# Patient Record
Sex: FEMALE | Race: WHITE | NOT HISPANIC OR LATINO | Employment: FULL TIME | URBAN - NONMETROPOLITAN AREA
[De-identification: names, ages, dates, MRNs, and addresses within clinical notes are randomized per-mention and may not be internally consistent; named-entity substitution may affect disease eponyms.]

---

## 2022-06-03 ENCOUNTER — OFFICE VISIT (OUTPATIENT)
Dept: CARDIOLOGY | Facility: CLINIC | Age: 32
End: 2022-06-03

## 2022-06-03 VITALS
BODY MASS INDEX: 33.32 KG/M2 | WEIGHT: 200 LBS | HEIGHT: 65 IN | DIASTOLIC BLOOD PRESSURE: 76 MMHG | OXYGEN SATURATION: 97 % | SYSTOLIC BLOOD PRESSURE: 118 MMHG | HEART RATE: 57 BPM

## 2022-06-03 DIAGNOSIS — R42 LIGHTHEADEDNESS: Primary | ICD-10-CM

## 2022-06-03 DIAGNOSIS — R00.1 BRADYCARDIA, SINUS: ICD-10-CM

## 2022-06-03 DIAGNOSIS — I49.8 SINUS ARRHYTHMIA: ICD-10-CM

## 2022-06-03 PROCEDURE — 99204 OFFICE O/P NEW MOD 45 MIN: CPT | Performed by: INTERNAL MEDICINE

## 2022-06-03 PROCEDURE — 93000 ELECTROCARDIOGRAM COMPLETE: CPT | Performed by: INTERNAL MEDICINE

## 2022-06-03 RX ORDER — HYDROXYZINE PAMOATE 50 MG/1
50 CAPSULE ORAL 3 TIMES DAILY PRN
COMMUNITY

## 2022-06-03 RX ORDER — SUMATRIPTAN 50 MG/1
50 TABLET, FILM COATED ORAL
COMMUNITY
End: 2023-02-10

## 2022-06-03 NOTE — PROGRESS NOTES
Electrophysiology Clinic Consult     Naty Laureano  1990  [unfilled]  [unfilled]    06/03/22    DATE OF ADMISSION: (Not on file)  [facility] CARDIOLOGY    Radha Camilo, RAÚL  [address] / [facility] [address]  Referring Provider: No ref. provider found     Chief Complaint   Patient presents with   • Slow Heart Rate   • Syncope   • Chest Pain   • Dizziness   • Shortness of Breath   • Irregular Heart Beat     Problem list:    1.  Bradycardia   A.  Echocardiogram March 2022 LVEF normal with no significant valvular  heart disease   B.  GXT exercise test March 2022 walked for 5 minutes and 48 seconds with  normal chronotropic response getting her heart rate up to 148 bpm.   C.  2-week monitor April 5, 2022 average heart rate 75 bpm with upper  maximal heart rate 156 bpm and lowest heart rate of 37 bpm.  No significant  atrial or ventricular arrhythmias noted.  Her heart rate was below 60 bpm 30%  of the time.  Symptoms of lightheadedness skipped beats fatigue shortness of  breath correlated to heart rates from 46 to 100 bpm.    2.  Migraine headaches  3.  History of tobacco abuse now vaping  4.  Anxiety disorder  5.  Urinary tract infection      History of Present Illness:     32-year-old female referred to us for evaluation of near syncope, lightheadedness, and bradycardia.  Patient states that her history started last summer when she started noticing that her heart rate would drop seen on her watch.  Prior to that episode she denies any significant viral issues or other health issues.  She at times becomes lightheaded with the low heart rate.  She presented to the emergency room in March 2022 after having an episode of dizziness, lightheadedness and apparent brief syncope while sitting at her office at [employer].  She has had no further episodes of syncope since then.  She states that she does have lightheadedness only with sitting frequently.  She underwent an  echocardiogram, GXT, and a event recorder since then.  The results of these tests are noted above.  While in the emergency room, laboratory evaluation was within normal limits.  She had a normal TSH level.  Liver function tests were normal.  COVID testing was negative.  Her symptoms when she has them include not only lightheadedness but at times palpitations, skipped beats, and chest discomfort she describes a moderate's symptomatology.  Her hematocrit was found to be slightly decreased at 33 while she was in the emergency room.  She does note back pain that sometimes radiates to the back of her neck and numbness of her left arm and left leg.  Last ER visit prior to March 2022 was in December 2021 when she presented with chest pain and dizziness and was found to have a heart rate of 67 bpm.  She denies any recreational drug use, over-the-counter medications, or weight loss pills.  She does not exercise on a frequent basis.  There is no family history of sick sinus syndrome at a young age or the need of a pacemaker early in life.  In January of this year, the patient stopped smoking as vaping.  She is now trying to reduce the frequency of her vaping.  No history of tick bites.      No Known Allergies     Cannot display prior to admission medications because the patient has not been admitted in this contact.            Current Outpatient Medications:   •  hydrOXYzine pamoate (VISTARIL) 50 MG capsule, Take 50 mg by mouth 3 (Three) Times a Day As Needed for Itching., Disp: , Rfl:   •  SUMAtriptan (IMITREX) 50 MG tablet, Take 50 mg by mouth Every 2 (Two) Hours As Needed for Migraine. Take one tablet at onset of headache. May repeat dose one time in 2 hours if headache not relieved., Disp: , Rfl:     Social History     Socioeconomic History   • Marital status: Single   Tobacco Use   • Smoking status: Current Some Day Smoker   • Smokeless tobacco: Never Used   Vaping Use   • Vaping Use: Every day   • Substances: Nicotine  "  • Devices: Disposable   Substance and Sexual Activity   • Alcohol use: Not Currently   • Drug use: Never   • Sexual activity: Defer       Family History   Problem Relation Age of Onset   • No Known Problems Mother    • No Known Problems Father        REVIEW OF SYSTEMS:   CONST:  No weight loss, fever, chills, + weakness +fatigue.   HEENT:  No visual loss, blurred vision, double vision, yellow sclerae.                   No hearing loss, congestion, sore throat.   SKIN:      No rashes, urticaria, ulcers, sores.     RESP:     + shortness of breath, No hemoptysis, cough, sputum.   GI:           No anorexia, nausea, vomiting, diarrhea. No abdominal pain, melena.   :         No burning on urination, hematuria or increased frequency.  ENDO:    No diaphoresis, cold or heat intolerance. No polyuria or polydipsia.   NEURO:  + headache, dizziness, syncope, no paralysis, No ataxia,+ parasthesias.                  No change in bowel or bladder control. No history of CVA/TIA  MUSC:    No muscle, back pain, joint pain or stiffness.   HEME:    + anemia, No bleeding, bruising. No history of DVT/PE.  PSYCH:  + history of depression, anxiety    Vitals:    06/03/22 1335   BP: 118/76   BP Location: Right arm   Patient Position: Sitting   Pulse: 57   SpO2: 97%   Weight: 90.7 kg (200 lb)   Height: 163.8 cm (64.5\")                 Physical Exam:  GEN: Well nourished, well-developed, no acute distress  HEENT: Normocephalic, atraumatic, PERRLA, moist mucous membranes  NECK: Supple, NO JVD, no thyromegaly, no lymphadenopathy  CARD: S1S2, RRR, no murmur, gallop, rub  LUNGS: Clear to auscultation, normal respiratory effort  ABDOMEN: Soft, nontender, normal bowel sounds  EXTREMITIES: No gross deformities, no clubbing, cyanosis, or edema  SKIN: Warm, dry  NEURO: No focal deficits, alert and oriented x 3  PSYCHIATRIC: Normal affect and mood      I personally viewed and interpreted the patient's EKG/Telemetry/lab data    No results found for: " GLUCOSE, CALCIUM, NA, K, CO2, CL, BUN, CREATININE, EGFRIFAFRI, EGFRIFNONA, BCR, ANIONGAP  No results found for: WBC, HGB, HCT, MCV, PLT  No results found for: INR, PROTIME  No results found for: TSH, A0NKHBH, R7GPXNE, THYROIDAB          ECG 12 Lead    Date/Time: 6/3/2022 2:14 PM  Performed by: Momo Wilson MD  Authorized by: Momo Wilson MD   Comparison: not compared with previous ECG   Previous ECG: no previous ECG available  Rhythm: sinus bradycardia  BPM: 57                ICD-10-CM ICD-9-CM   1. Lightheadedness  R42 780.4   2. Bradycardia, sinus  R00.1 427.89   3. Sinus arrhythmia  I49.8 427.89       Assessment and Plan:     1.  Lightheadedness/near syncope etiology unknown.  Difficult to say at this time whether the episodic bradycardia is responsible for her lightheadedness.  Cardiac work-up to date is within normal limits.  On her event recorder, when she complained of lightheadedness, there was both bradycardia arrhythmias as well as normal sinus rhythm and sinus tachycardia.  She denies any previous history of significant virus infections.  He does have evidence of sinus arrhythmia as well as vagal slowing or heart rate at times.  At this point I would not favor pursuing pacemaker implantation as there is no clear-cut documentation to support improvement in her symptom complex by atrial pacing.  Instead would favor her discontinuing her vaping completely and pursue an exercise program.  Would favor also monitoring her again in the near future if she continues to have her symptoms.

## 2023-02-01 ENCOUNTER — APPOINTMENT (OUTPATIENT)
Dept: GENERAL RADIOLOGY | Facility: HOSPITAL | Age: 33
End: 2023-02-01
Payer: COMMERCIAL

## 2023-02-01 ENCOUNTER — HOSPITAL ENCOUNTER (EMERGENCY)
Facility: HOSPITAL | Age: 33
Discharge: HOME | End: 2023-02-01
Attending: EMERGENCY MEDICINE | Admitting: EMERGENCY MEDICINE
Payer: COMMERCIAL

## 2023-02-01 ENCOUNTER — APPOINTMENT (OUTPATIENT)
Dept: CT IMAGING | Facility: HOSPITAL | Age: 33
End: 2023-02-01
Payer: COMMERCIAL

## 2023-02-01 VITALS
OXYGEN SATURATION: 97 % | WEIGHT: 200 LBS | HEIGHT: 64 IN | DIASTOLIC BLOOD PRESSURE: 79 MMHG | RESPIRATION RATE: 20 BRPM | BODY MASS INDEX: 34.15 KG/M2 | TEMPERATURE: 97.7 F | HEART RATE: 45 BPM | SYSTOLIC BLOOD PRESSURE: 102 MMHG

## 2023-02-01 DIAGNOSIS — R42 DIZZINESS: ICD-10-CM

## 2023-02-01 DIAGNOSIS — R07.9 CHEST PAIN, UNSPECIFIED TYPE: Primary | ICD-10-CM

## 2023-02-01 DIAGNOSIS — R00.1 BRADYCARDIA: ICD-10-CM

## 2023-02-01 LAB
ALBUMIN SERPL-MCNC: 4.5 G/DL (ref 3.5–5.2)
ALBUMIN/GLOB SERPL: 1.7 G/DL
ALP SERPL-CCNC: 88 U/L (ref 39–117)
ALT SERPL W P-5'-P-CCNC: 19 U/L (ref 1–33)
ANION GAP SERPL CALCULATED.3IONS-SCNC: 12 MMOL/L (ref 5–15)
AST SERPL-CCNC: 15 U/L (ref 1–32)
BASOPHILS # BLD AUTO: 0.06 10*3/MM3 (ref 0–0.2)
BASOPHILS NFR BLD AUTO: 0.8 % (ref 0–1.5)
BILIRUB SERPL-MCNC: 0.8 MG/DL (ref 0–1.2)
BUN SERPL-MCNC: 12 MG/DL (ref 6–20)
BUN/CREAT SERPL: 16.7 (ref 7–25)
CALCIUM SPEC-SCNC: 9.1 MG/DL (ref 8.6–10.5)
CHLORIDE SERPL-SCNC: 106 MMOL/L (ref 98–107)
CO2 SERPL-SCNC: 22 MMOL/L (ref 22–29)
CREAT SERPL-MCNC: 0.72 MG/DL (ref 0.57–1)
DEPRECATED RDW RBC AUTO: 38.8 FL (ref 37–54)
EGFRCR SERPLBLD CKD-EPI 2021: 113.4 ML/MIN/1.73
EOSINOPHIL # BLD AUTO: 0.15 10*3/MM3 (ref 0–0.4)
EOSINOPHIL NFR BLD AUTO: 2 % (ref 0.3–6.2)
ERYTHROCYTE [DISTWIDTH] IN BLOOD BY AUTOMATED COUNT: 12.1 % (ref 12.3–15.4)
FLUAV SUBTYP SPEC NAA+PROBE: NOT DETECTED
FLUBV RNA ISLT QL NAA+PROBE: NOT DETECTED
GLOBULIN UR ELPH-MCNC: 2.7 GM/DL
GLUCOSE SERPL-MCNC: 121 MG/DL (ref 65–99)
HCT VFR BLD AUTO: 36.9 % (ref 34–46.6)
HGB BLD-MCNC: 12.5 G/DL (ref 12–15.9)
HOLD SPECIMEN: NORMAL
IMM GRANULOCYTES # BLD AUTO: 0.02 10*3/MM3 (ref 0–0.05)
IMM GRANULOCYTES NFR BLD AUTO: 0.3 % (ref 0–0.5)
LIPASE SERPL-CCNC: 46 U/L (ref 13–60)
LYMPHOCYTES # BLD AUTO: 2.22 10*3/MM3 (ref 0.7–3.1)
LYMPHOCYTES NFR BLD AUTO: 29.3 % (ref 19.6–45.3)
MAGNESIUM SERPL-MCNC: 1.8 MG/DL (ref 1.6–2.6)
MCH RBC QN AUTO: 29.5 PG (ref 26.6–33)
MCHC RBC AUTO-ENTMCNC: 33.9 G/DL (ref 31.5–35.7)
MCV RBC AUTO: 87 FL (ref 79–97)
MONOCYTES # BLD AUTO: 0.39 10*3/MM3 (ref 0.1–0.9)
MONOCYTES NFR BLD AUTO: 5.1 % (ref 5–12)
NEUTROPHILS NFR BLD AUTO: 4.74 10*3/MM3 (ref 1.7–7)
NEUTROPHILS NFR BLD AUTO: 62.5 % (ref 42.7–76)
NRBC BLD AUTO-RTO: 0 /100 WBC (ref 0–0.2)
NT-PROBNP SERPL-MCNC: 140.9 PG/ML (ref 0–450)
PLATELET # BLD AUTO: 398 10*3/MM3 (ref 140–450)
PMV BLD AUTO: 9.4 FL (ref 6–12)
POTASSIUM SERPL-SCNC: 3.3 MMOL/L (ref 3.5–5.2)
PROT SERPL-MCNC: 7.2 G/DL (ref 6–8.5)
QT INTERVAL: 454 MS
QT INTERVAL: 474 MS
QTC INTERVAL: 397 MS
QTC INTERVAL: 436 MS
RBC # BLD AUTO: 4.24 10*6/MM3 (ref 3.77–5.28)
SARS-COV-2 RNA PNL SPEC NAA+PROBE: NOT DETECTED
SODIUM SERPL-SCNC: 140 MMOL/L (ref 136–145)
TROPONIN T SERPL-MCNC: <0.01 NG/ML (ref 0–0.03)
TROPONIN T SERPL-MCNC: <0.01 NG/ML (ref 0–0.03)
TSH SERPL DL<=0.05 MIU/L-ACNC: 3.87 UIU/ML (ref 0.27–4.2)
WBC NRBC COR # BLD: 7.58 10*3/MM3 (ref 3.4–10.8)
WHOLE BLOOD HOLD COAG: NORMAL
WHOLE BLOOD HOLD SPECIMEN: NORMAL

## 2023-02-01 PROCEDURE — 99284 EMERGENCY DEPT VISIT MOD MDM: CPT

## 2023-02-01 PROCEDURE — 36415 COLL VENOUS BLD VENIPUNCTURE: CPT

## 2023-02-01 PROCEDURE — 80050 GENERAL HEALTH PANEL: CPT | Performed by: EMERGENCY MEDICINE

## 2023-02-01 PROCEDURE — 71275 CT ANGIOGRAPHY CHEST: CPT

## 2023-02-01 PROCEDURE — 83880 ASSAY OF NATRIURETIC PEPTIDE: CPT | Performed by: EMERGENCY MEDICINE

## 2023-02-01 PROCEDURE — 71045 X-RAY EXAM CHEST 1 VIEW: CPT

## 2023-02-01 PROCEDURE — 87636 SARSCOV2 & INF A&B AMP PRB: CPT | Performed by: PHYSICIAN ASSISTANT

## 2023-02-01 PROCEDURE — 84484 ASSAY OF TROPONIN QUANT: CPT | Performed by: EMERGENCY MEDICINE

## 2023-02-01 PROCEDURE — 93005 ELECTROCARDIOGRAM TRACING: CPT | Performed by: EMERGENCY MEDICINE

## 2023-02-01 PROCEDURE — 83690 ASSAY OF LIPASE: CPT | Performed by: EMERGENCY MEDICINE

## 2023-02-01 PROCEDURE — 83735 ASSAY OF MAGNESIUM: CPT | Performed by: PHYSICIAN ASSISTANT

## 2023-02-01 PROCEDURE — 0 IOPAMIDOL PER 1 ML: Performed by: EMERGENCY MEDICINE

## 2023-02-01 RX ORDER — ASPIRIN 81 MG/1
324 TABLET, CHEWABLE ORAL ONCE
Status: COMPLETED | OUTPATIENT
Start: 2023-02-01 | End: 2023-02-01

## 2023-02-01 RX ORDER — MECLIZINE HYDROCHLORIDE 25 MG/1
25 TABLET ORAL ONCE
Status: COMPLETED | OUTPATIENT
Start: 2023-02-01 | End: 2023-02-01

## 2023-02-01 RX ORDER — SODIUM CHLORIDE 0.9 % (FLUSH) 0.9 %
10 SYRINGE (ML) INJECTION AS NEEDED
Status: DISCONTINUED | OUTPATIENT
Start: 2023-02-01 | End: 2023-02-01 | Stop reason: HOSPADM

## 2023-02-01 RX ADMIN — IOPAMIDOL 79 ML: 755 INJECTION, SOLUTION INTRAVENOUS at 15:54

## 2023-02-01 RX ADMIN — MECLIZINE 25 MG: 25 TABLET ORAL at 16:56

## 2023-02-01 RX ADMIN — ASPIRIN 81 MG CHEWABLE TABLET 324 MG: 81 TABLET CHEWABLE at 16:02

## 2023-02-01 NOTE — ED PROVIDER NOTES
Subjective   History of Present Illness  Pt is a 33 yo female presenting to ED with complaints of chest pain. PMHx significant Anxiety, Afib (?) and Bradycardia. Pt reports mid sternal chest pressure and SOB for the past 2-3 hours with episodes of lightheadedness which she thought initially was reflux. She reports yesterday was having neck and upper back pain and evaluated at [place] ED with negative CT head and neck. She has had intermittent tingling to bilateral hands. She denies headache, syncope, fever, cough, SOB, N/V/D, abdominal pain or leg swelling. She reports episodes of bradycardia, CP, lightheadedness for about a year and has been followed by Dr. Wilson in the past. She vapes but denies ETOH or drug use.     History provided by:  Medical records and patient      Review of Systems   Constitutional: Negative for chills and fever.   HENT: Negative for congestion.    Eyes: Negative for visual disturbance.   Respiratory: Positive for shortness of breath. Negative for cough.    Cardiovascular: Positive for chest pain. Negative for leg swelling.   Gastrointestinal: Negative for abdominal pain, diarrhea, nausea and vomiting.   Genitourinary: Negative for difficulty urinating, dysuria, flank pain and hematuria.   Musculoskeletal: Positive for back pain (upper, resolved, yesterday) and neck pain (yesterday, resolved). Negative for arthralgias.   Skin: Negative for rash and wound.   Neurological: Positive for light-headedness. Negative for syncope, speech difficulty, weakness, numbness and headaches.   Psychiatric/Behavioral: Negative for confusion.   All other systems reviewed and are negative.      Past Medical History:   Diagnosis Date   • Abnormal ECG    • Anxiety    • Atrial fibrillation (HCC)        No Known Allergies    Past Surgical History:   Procedure Laterality Date   • CHOLECYSTECTOMY     • KNEE ACL RECONSTRUCTION         Family History   Problem Relation Age of Onset   • No Known Problems Mother     • No Known Problems Father        Social History     Socioeconomic History   • Marital status: Single   Tobacco Use   • Smoking status: Some Days   • Smokeless tobacco: Never   Vaping Use   • Vaping Use: Every day   • Substances: Nicotine   • Devices: Disposable   Substance and Sexual Activity   • Alcohol use: Not Currently   • Drug use: Never   • Sexual activity: Defer           Objective   Physical Exam  Vitals and nursing note reviewed.   Constitutional:       General: She is not in acute distress.     Appearance: She is well-developed. She is obese.   HENT:      Head: Atraumatic.      Nose: Nose normal.   Eyes:      General: Lids are normal.      Conjunctiva/sclera: Conjunctivae normal.      Pupils: Pupils are equal, round, and reactive to light.   Cardiovascular:      Rate and Rhythm: Regular rhythm. Bradycardia present.      Heart sounds: Normal heart sounds.   Pulmonary:      Effort: Pulmonary effort is normal.      Breath sounds: Normal breath sounds. No wheezing.   Abdominal:      General: There is no distension.      Palpations: Abdomen is soft.      Tenderness: There is no abdominal tenderness. There is no guarding or rebound.   Musculoskeletal:         General: No tenderness. Normal range of motion.      Cervical back: Normal range of motion and neck supple.   Skin:     General: Skin is warm and dry.      Findings: No erythema or rash.   Neurological:      Mental Status: She is alert and oriented to person, place, and time.      Cranial Nerves: Cranial nerves 2-12 are intact.      Sensory: Sensation is intact. No sensory deficit.      Motor: Motor function is intact.   Psychiatric:         Mood and Affect: Mood is anxious.         Speech: Speech normal.         Behavior: Behavior normal.         Procedures           ED Course  ED Course as of 02/01/23 2022 Wed Feb 01, 2023   1524 Troponin T: <0.010 [RT]   1524 proBNP: 140.9 [RT]   1524 TSH Baseline: 3.870 [RT]   1622 CTA chest negative for acute  findings.   Awaiting repeat Trop / EKG.  [RT]      ED Course User Index  [RT] Pearl Young, PA      Recent Results (from the past 24 hour(s))   ECG 12 Lead ED Triage Standing Order; Chest Pain    Collection Time: 02/01/23  1:41 PM   Result Value Ref Range    QT Interval 474 ms    QTC Interval 436 ms   Troponin    Collection Time: 02/01/23  1:56 PM    Specimen: Blood   Result Value Ref Range    Troponin T <0.010 0.000 - 0.030 ng/mL   Comprehensive Metabolic Panel    Collection Time: 02/01/23  1:56 PM    Specimen: Blood   Result Value Ref Range    Glucose 121 (H) 65 - 99 mg/dL    BUN 12 6 - 20 mg/dL    Creatinine 0.72 0.57 - 1.00 mg/dL    Sodium 140 136 - 145 mmol/L    Potassium 3.3 (L) 3.5 - 5.2 mmol/L    Chloride 106 98 - 107 mmol/L    CO2 22.0 22.0 - 29.0 mmol/L    Calcium 9.1 8.6 - 10.5 mg/dL    Total Protein 7.2 6.0 - 8.5 g/dL    Albumin 4.5 3.5 - 5.2 g/dL    ALT (SGPT) 19 1 - 33 U/L    AST (SGOT) 15 1 - 32 U/L    Alkaline Phosphatase 88 39 - 117 U/L    Total Bilirubin 0.8 0.0 - 1.2 mg/dL    Globulin 2.7 gm/dL    A/G Ratio 1.7 g/dL    BUN/Creatinine Ratio 16.7 7.0 - 25.0    Anion Gap 12.0 5.0 - 15.0 mmol/L    eGFR 113.4 >60.0 mL/min/1.73   Lipase    Collection Time: 02/01/23  1:56 PM    Specimen: Blood   Result Value Ref Range    Lipase 46 13 - 60 U/L   BNP    Collection Time: 02/01/23  1:56 PM    Specimen: Blood   Result Value Ref Range    proBNP 140.9 0.0 - 450.0 pg/mL   Green Top (Gel)    Collection Time: 02/01/23  1:56 PM   Result Value Ref Range    Extra Tube Hold for add-ons.    Lavender Top    Collection Time: 02/01/23  1:56 PM   Result Value Ref Range    Extra Tube hold for add-on    Gold Top - SST    Collection Time: 02/01/23  1:56 PM   Result Value Ref Range    Extra Tube Hold for add-ons.    Gray Top    Collection Time: 02/01/23  1:56 PM   Result Value Ref Range    Extra Tube Hold for add-ons.    Light Blue Top    Collection Time: 02/01/23  1:56 PM   Result Value Ref Range    Extra Tube Hold for  add-ons.    CBC Auto Differential    Collection Time: 02/01/23  1:56 PM    Specimen: Blood   Result Value Ref Range    WBC 7.58 3.40 - 10.80 10*3/mm3    RBC 4.24 3.77 - 5.28 10*6/mm3    Hemoglobin 12.5 12.0 - 15.9 g/dL    Hematocrit 36.9 34.0 - 46.6 %    MCV 87.0 79.0 - 97.0 fL    MCH 29.5 26.6 - 33.0 pg    MCHC 33.9 31.5 - 35.7 g/dL    RDW 12.1 (L) 12.3 - 15.4 %    RDW-SD 38.8 37.0 - 54.0 fl    MPV 9.4 6.0 - 12.0 fL    Platelets 398 140 - 450 10*3/mm3    Neutrophil % 62.5 42.7 - 76.0 %    Lymphocyte % 29.3 19.6 - 45.3 %    Monocyte % 5.1 5.0 - 12.0 %    Eosinophil % 2.0 0.3 - 6.2 %    Basophil % 0.8 0.0 - 1.5 %    Immature Grans % 0.3 0.0 - 0.5 %    Neutrophils, Absolute 4.74 1.70 - 7.00 10*3/mm3    Lymphocytes, Absolute 2.22 0.70 - 3.10 10*3/mm3    Monocytes, Absolute 0.39 0.10 - 0.90 10*3/mm3    Eosinophils, Absolute 0.15 0.00 - 0.40 10*3/mm3    Basophils, Absolute 0.06 0.00 - 0.20 10*3/mm3    Immature Grans, Absolute 0.02 0.00 - 0.05 10*3/mm3    nRBC 0.0 0.0 - 0.2 /100 WBC   TSH    Collection Time: 02/01/23  1:56 PM    Specimen: Blood   Result Value Ref Range    TSH 3.870 0.270 - 4.200 uIU/mL   Magnesium    Collection Time: 02/01/23  1:56 PM    Specimen: Blood   Result Value Ref Range    Magnesium 1.8 1.6 - 2.6 mg/dL   COVID-19 and FLU A/B PCR - Swab, Nasopharynx    Collection Time: 02/01/23  2:00 PM    Specimen: Nasopharynx; Swab   Result Value Ref Range    COVID19 Not Detected Not Detected - Ref. Range    Influenza A PCR Not Detected Not Detected    Influenza B PCR Not Detected Not Detected   Troponin    Collection Time: 02/01/23  4:28 PM    Specimen: Blood   Result Value Ref Range    Troponin T <0.010 0.000 - 0.030 ng/mL   ECG 12 Lead Chest Pain    Collection Time: 02/01/23  4:38 PM   Result Value Ref Range    QT Interval 454 ms    QTC Interval 397 ms     Note: In addition to lab results from this visit, the labs listed above may include labs taken at another facility or during a different encounter within  the last 24 hours. Please correlate lab times with ED admission and discharge times for further clarification of the services performed during this visit.    CT Angiogram Chest   Final Result   Impression:   No evidence of pulmonary embolus or other acute process in the chest.      Electronically Signed: Sharath Groves     2/1/2023 4:04 PM EST     Workstation ID: [id number]      XR Chest 1 View   Final Result   Impression:   Mild nonspecific peribronchial thickening. No other evidence of active chest pathology.      Electronically Signed: Nicola Kinsey     2/1/2023 2:32 PM EST     Workstation ID: [id number]        Vitals:    02/01/23 1525 02/01/23 1600 02/01/23 1630 02/01/23 1700   BP:  115/61 109/84 102/79   BP Location:       Patient Position:       Pulse: 54 50 59 (!) 45   Resp:       Temp:       TempSrc:       SpO2: 100% 95% 100% 97%   Weight:       Height:         Medications   aspirin chewable tablet 324 mg (324 mg Oral Given 2/1/23 1602)   iopamidol (ISOVUE-370) 76 % injection 100 mL (79 mL Intravenous Given 2/1/23 1554)   meclizine (ANTIVERT) tablet 25 mg (25 mg Oral Given 2/1/23 1656)     ECG/EMG Results (last 24 hours)     Procedure Component Value Units Date/Time    ECG 12 Lead ED Triage Standing Order; Chest Pain [[id number]] Collected: 02/01/23 1341     Updated: 02/01/23 1600     QT Interval 474 ms      QTC Interval 436 ms     Narrative:      Test Reason : ED Triage Standing Order~  Blood Pressure :   */*   mmHG  Vent. Rate :  51 BPM     Atrial Rate :  51 BPM     P-R Int : 154 ms          QRS Dur :  88 ms      QT Int : 474 ms       P-R-T Axes :  16   7  14 degrees     QTc Int : 436 ms    Sinus bradycardia with sinus arrhythmia  Otherwise normal ECG  No previous ECGs available  Confirmed by PATRICIA SMTIH MD ([id number]) on 2/1/2023 4:00:13 PM    Referred By: PHUONG           Confirmed By: PATRICIA SMITH MD        ECG 12 Lead Chest Pain   Final Result   Test Reason : Chest Pain   Blood Pressure :   */*   mmHG   Vent.  Rate :  46 BPM     Atrial Rate :  46 BPM      P-R Int : 158 ms          QRS Dur :  78 ms       QT Int : 454 ms       P-R-T Axes :  29   8  22 degrees      QTc Int : 397 ms      Sinus bradycardia   Otherwise normal ECG   When compared with ECG of 01-FEB-2023 13:41,   No significant change was found   Confirmed by PATRICIA SMITH MD ([id number]) on 2/1/2023 5:06:03 PM      Referred By: PHUONG           Confirmed By: PATRICIA SMITH MD      ECG 12 Lead ED Triage Standing Order; Chest Pain   Final Result   Test Reason : ED Triage Standing Order~   Blood Pressure :   */*   mmHG   Vent. Rate :  51 BPM     Atrial Rate :  51 BPM      P-R Int : 154 ms          QRS Dur :  88 ms       QT Int : 474 ms       P-R-T Axes :  16   7  14 degrees      QTc Int : 436 ms      Sinus bradycardia with sinus arrhythmia   Otherwise normal ECG   No previous ECGs available   Confirmed by PATRICIA SMITH MD ([id number]) on 2/1/2023 4:00:13 PM      Referred By: PHUONG           Confirmed By: PATRICIA SMITH MD                                               Medical Decision Making  Pt is a 31 yo female presenting to ED with complaints of CP, SOB and tingling in hands. Pt with hx of prior bradycardia and followed by Dr. Wilson. She was seen at [place] ED yesterday for neck pain and lightheadedness. Reviewed records from [place] and negative CTA head and neck for emergent findings. ED workup today with no acute ischemic changes on EKG x2 and Trop x2 WNL. CTA chest WNL. Discussed patient with Dr. Smith and he is agreeable with ED course and tx plan. Re-examined patient several times in ED. She is feeling better and discussed discharge with outpatient f/u with cardiology and PCP. Referral placed for cardiology follow up. Patient and family agreeable with plan. Went over new / worse sx to return to ED.     DDx  CVA, PE, ACS, Unstable angina, Arrhythmia, Electrolyte abnormality, Aortic dissection, Pneumonia    Bradycardia: acute illness or injury  Chest pain,  unspecified type: acute illness or injury  Dizziness: acute illness or injury  Amount and/or Complexity of Data Reviewed  External Data Reviewed: labs, radiology and notes.     Details: PCP, Cards, Outside ED records   Labs: ordered. Decision-making details documented in ED Course.  Radiology: ordered. Decision-making details documented in ED Course.  ECG/medicine tests: ordered. Decision-making details documented in ED Course.      Risk  OTC drugs.  Prescription drug management.          Final diagnoses:   Chest pain, unspecified type   Dizziness   Bradycardia       ED Disposition  ED Disposition     ED Disposition   Discharge    Condition   Stable    Comment   --             [facility] CARDIOLOGY  [address]  [address]  [facility] [id number] [phone]        Kristi Luna, APRN  [address]  [address]  [facility] [address] [phone]    Schedule an appointment as soon as possible for a visit       [facility] Emergency Department  [address] [address] [phone]    If symptoms worsen         Medication List      No changes were made to your prescriptions during this visit.          Pearl Young PA  02/01/23 2022

## 2023-02-09 PROBLEM — R00.1 BRADYCARDIA: Status: ACTIVE | Noted: 2023-02-09

## 2023-02-09 PROBLEM — R00.2 PALPITATIONS: Status: ACTIVE | Noted: 2023-02-09

## 2023-02-09 PROBLEM — R07.89 ATYPICAL CHEST PAIN: Status: ACTIVE | Noted: 2023-02-09

## 2023-02-09 PROBLEM — F41.9 ANXIETY: Status: ACTIVE | Noted: 2023-02-09

## 2023-02-09 NOTE — PROGRESS NOTES
"[facility]  Heart and Valve Center    Chief Complaint  Chest Pain, Dizziness, and Slow Heart Rate    Subjective    History of Present Illness {CC  Problem List  Visit  Diagnosis   Encounters  Notes  Medications  Labs  Result Review Imaging  Media :23}     Naty Laureano is a 32 y.o. female with bradycardia, history of atypical chest pain and dizziness who presents today for ongoing evaluation of chest pain and dizziness at the request of Bernice Young PA-C.    Patient presented to the ED on 2/1 with complaints of chest pain.  She reports a midsternal chest pressure and shortness of breath with associated lightheadedness.  She was also evaluated at [facility] 1 day prior.  Reports she had a pain in the back of her neck that shot down her spine and says she \"passed out\" on the floor for a few seconds. CTA chest, labs and EKGs unremarkable.    She was evaluated last year by Dr. Wilson for lightheadedness and bradycardia.  She had an exercise stress test in March 2022 with normal chronotropic response and no evidence of ischemia.  She wore 2-week Holter monitor in April 2022 which showed no arrhythmias, sinus bradycardia noted.    Reports constant dizziness and lightheadedness with associated headache and numbness and tingling of her fingers. Feels like the environment is spinning. Feels random and not always with position changes. Notes occasional sharp chest pains that radiate to her right shoulder. Chest pains occurs at rest and occasionally with activity, but main concern is lightheadedness with exertion. Reports frequent palpitations, like her \"heart is out of rhythm\". Reports 3 episodes of passing out in the past 6 or 7 months.     Works in administration at [employer]    Quit vaping 2 weeks ago     Cardiac risks: obesity, family hx    Objective     Vital Signs:   Vitals:    02/10/23 0819 02/10/23 0821 02/10/23 0822   BP: 116/70 121/83 116/73   BP Location: Right arm Left " "arm Left arm   Patient Position: Sitting Standing Sitting   Cuff Size: Adult Adult Adult   Pulse: 50 70 51   Resp:   20   Temp: 97.1 °F (36.2 °C) 97.1 °F (36.2 °C) 97.1 °F (36.2 °C)   TempSrc: Temporal Temporal Temporal   SpO2: 98% 99% 99%   Weight:   102 kg (224 lb 6.4 oz)   Height:   162.6 cm (64\")     Body mass index is 38.52 kg/m².  Physical Exam  Vitals reviewed.   Constitutional:       Appearance: Normal appearance.   HENT:      Head: Normocephalic.   Neck:      Vascular: No carotid bruit.   Cardiovascular:      Rate and Rhythm: Regular rhythm. Bradycardia present.      Pulses: Normal pulses.      Heart sounds: Normal heart sounds, S1 normal and S2 normal. No murmur heard.  Pulmonary:      Effort: Pulmonary effort is normal. No respiratory distress.      Breath sounds: Normal breath sounds.   Chest:      Chest wall: No tenderness.   Abdominal:      General: Abdomen is flat.      Palpations: Abdomen is soft.   Musculoskeletal:      Cervical back: Neck supple.      Right lower leg: No edema.      Left lower leg: No edema.   Skin:     General: Skin is warm and dry.   Neurological:      General: No focal deficit present.      Mental Status: She is alert and oriented to person, place, and time. Mental status is at baseline.   Psychiatric:         Mood and Affect: Mood normal.         Behavior: Behavior normal.         Thought Content: Thought content normal.              Result Review  Data Reviewed:{ Labs  Result Review  Imaging  Med Tab  Media :23}   Troponin (02/01/2023 16:28)  COVID PRE-OP / PRE-PROCEDURE SCREENING ORDER (NO ISOLATION) - Swab, Nasopharynx (02/01/2023 14:00)  Magnesium (02/01/2023 13:56)  TSH (02/01/2023 13:56)  BNP (02/01/2023 13:56)  Lipase (02/01/2023 13:56)  Comprehensive Metabolic Panel (02/01/2023 13:56)  CBC & Differential (02/01/2023 13:56)  Troponin (02/01/2023 13:56)  ECG 12 Lead Chest Pain (02/01/2023 16:38)  ECG 12 Lead ED Triage Standing Order; Chest Pain (02/01/2023 " 13:41)  SCANNED - HOLTER MONITOR (04/05/2022)    Reviewed noted from Dr. Wilson           Assessment and Plan {CC Problem List  Visit Diagnosis  ROS  Review (Popup)  Health Maintenance  Quality  BestPractice  Medications  SmartSets  SnapShot Encounters  Media :23}   1. Lightheadedness  - Hx of symptomatic sinus bradycardia  - Treadmill Stress Test; Future  - Mobile Cardiac Outpatient Telemetry; Future    2. Bradycardia, sinus    - Treadmill Stress Test; Future  - Mobile Cardiac Outpatient Telemetry; Future    3. Syncope and collapse    - Treadmill Stress Test; Future  - Mobile Cardiac Outpatient Telemetry; Future    4. Chest pain, unspecified type  -Symptoms are atypical for ischemia, however, due to reduced exercise tolerance she needs a repeat GXT to assess for chronotropic incompetence  - Treadmill Stress Test; Future      Follow Up {Instructions Charge Capture  Follow-up Communications :23}   Return in about 6 weeks (around 3/24/2023) for Monitor results, Video Visit.    Patient was given instructions and counseling regarding her condition or for health maintenance advice. Please see specific information pulled into the AVS if appropriate.  Advised to call the Heart and Valve Center with any questions, concerns, or worsening symptoms.

## 2023-02-10 ENCOUNTER — HOSPITAL ENCOUNTER (OUTPATIENT)
Dept: CARDIOLOGY | Facility: HOSPITAL | Age: 33
Discharge: HOME | End: 2023-02-10
Payer: COMMERCIAL

## 2023-02-10 ENCOUNTER — OFFICE VISIT (OUTPATIENT)
Dept: CARDIOLOGY | Facility: HOSPITAL | Age: 33
End: 2023-02-10
Payer: COMMERCIAL

## 2023-02-10 VITALS
HEART RATE: 51 BPM | TEMPERATURE: 97.1 F | RESPIRATION RATE: 20 BRPM | HEIGHT: 64 IN | DIASTOLIC BLOOD PRESSURE: 73 MMHG | OXYGEN SATURATION: 99 % | SYSTOLIC BLOOD PRESSURE: 116 MMHG | WEIGHT: 224.4 LBS | BODY MASS INDEX: 38.31 KG/M2

## 2023-02-10 DIAGNOSIS — R00.1 BRADYCARDIA, SINUS: ICD-10-CM

## 2023-02-10 DIAGNOSIS — R55 SYNCOPE AND COLLAPSE: ICD-10-CM

## 2023-02-10 DIAGNOSIS — R42 LIGHTHEADEDNESS: ICD-10-CM

## 2023-02-10 DIAGNOSIS — R42 LIGHTHEADEDNESS: Primary | ICD-10-CM

## 2023-02-10 DIAGNOSIS — R07.9 CHEST PAIN, UNSPECIFIED TYPE: ICD-10-CM

## 2023-02-10 PROCEDURE — 99214 OFFICE O/P EST MOD 30 MIN: CPT | Performed by: NURSE PRACTITIONER

## 2023-02-10 RX ORDER — MECLIZINE HYDROCHLORIDE 25 MG/1
25 TABLET ORAL 3 TIMES DAILY PRN
COMMUNITY

## 2023-02-10 RX ORDER — MULTIPLE VITAMINS W/ MINERALS TAB 9MG-400MCG
1 TAB ORAL DAILY
COMMUNITY

## 2023-02-10 RX ORDER — PANTOPRAZOLE SODIUM 20 MG/1
20 TABLET, DELAYED RELEASE ORAL DAILY
COMMUNITY

## 2023-02-10 NOTE — PROGRESS NOTES
[facility] Heart Monitor Documentation    Naty Laureano  1990  [id number]  02/10/23      [] ZIO XT Patch  Model [id number] Prescribed for  Days    · Serial Number: (N + 9 Digits) N   · Apply-By Date on Box:   · USPS Tracking Number:   · USPS Tracking        [x] [employer] BodyGuardian MINI PLUS Mobile Cardiac Telemetry  Model BGMINIPLUS Prescribed for 30 Days    · Serial Number: (BGM + 7 Digits) [id number]  · Shipped-By Date on Box: 01/30/2023  · UPS Tracking Number: [id number]  · UPS Tracking      [] [employer]ian MINI Holter Monitor  Model BGMINIEL Prescribed for  Days    · Serial Number: (7 Digits)   · Shipped-By Date on Box:   · UPS Tracking Number: [id number]  · UPS Tracking        This monitor was applied to the patient's chest and checked for proper functioning.  Ms. Naty Laureano was instructed in the proper use of this monitor.  She was given the opportunity to ask questions and left the office with the device 's instruction manual.    Lesli Lr, [facility], 08:45 EST, 02/10/23                  [facility]

## 2023-02-13 ENCOUNTER — TELEPHONE (OUTPATIENT)
Dept: CARDIOLOGY | Facility: HOSPITAL | Age: 33
End: 2023-02-13
Payer: COMMERCIAL

## 2023-02-13 NOTE — TELEPHONE ENCOUNTER
----- Message from Lesli Lr CMA sent at 2/13/2023  8:19 AM EST -----  Patient reports that yesterday she was at a friend's house and had a feeling of lightheadedness and dizziness. She states that her friend told her that she looked really pale. She states that she went to the ER last night at [facility] and they were asking if she could get the provider to look at her strips on the heart monitor to see if there was any events. She states that she is still having some lightheadedness and dizziness today. She would like to have a call back.     Medical records have been requested.

## 2023-02-13 NOTE — TELEPHONE ENCOUNTER
Attempted to contact patient but unable to reach. Do you know what time? I did not see any events from last night. There was one event yesterday morning around 9am but it looked like artifact and haven't seen anything since.Would recommend that she continue to wear so we can look for further events and I will let her know if I see anything

## 2023-02-13 NOTE — TELEPHONE ENCOUNTER
Pt returned call and stated she is still having symptoms. I told her that you recommend to continue to wear monitor. Pt verbalized understanding had no further concerns. Still waiting for all the ED records to come through from [facility]

## 2023-02-14 ENCOUNTER — HOSPITAL ENCOUNTER (EMERGENCY)
Facility: HOSPITAL | Age: 33
Discharge: HOME | End: 2023-02-14
Attending: EMERGENCY MEDICINE | Admitting: EMERGENCY MEDICINE
Payer: COMMERCIAL

## 2023-02-14 VITALS
TEMPERATURE: 98.8 F | DIASTOLIC BLOOD PRESSURE: 64 MMHG | BODY MASS INDEX: 33.32 KG/M2 | RESPIRATION RATE: 16 BRPM | OXYGEN SATURATION: 98 % | WEIGHT: 200 LBS | HEIGHT: 65 IN | SYSTOLIC BLOOD PRESSURE: 122 MMHG | HEART RATE: 86 BPM

## 2023-02-14 DIAGNOSIS — R00.1 BRADYCARDIA: ICD-10-CM

## 2023-02-14 DIAGNOSIS — R00.2 PALPITATIONS: Primary | ICD-10-CM

## 2023-02-14 DIAGNOSIS — R55 PRE-SYNCOPE: ICD-10-CM

## 2023-02-14 DIAGNOSIS — R55 SYNCOPE, UNSPECIFIED SYNCOPE TYPE: ICD-10-CM

## 2023-02-14 DIAGNOSIS — R42 EPISODIC LIGHTHEADEDNESS: ICD-10-CM

## 2023-02-14 DIAGNOSIS — Z91.89 AT RISK FOR OBSTRUCTIVE SLEEP APNEA: ICD-10-CM

## 2023-02-14 LAB
ALBUMIN SERPL-MCNC: 4.4 G/DL (ref 3.5–5.2)
ALBUMIN/GLOB SERPL: 1.5 G/DL
ALP SERPL-CCNC: 101 U/L (ref 39–117)
ALT SERPL W P-5'-P-CCNC: 29 U/L (ref 1–33)
ANION GAP SERPL CALCULATED.3IONS-SCNC: 10 MMOL/L (ref 5–15)
AST SERPL-CCNC: 17 U/L (ref 1–32)
BASOPHILS # BLD AUTO: 0.06 10*3/MM3 (ref 0–0.2)
BASOPHILS NFR BLD AUTO: 0.5 % (ref 0–1.5)
BILIRUB SERPL-MCNC: 1 MG/DL (ref 0–1.2)
BUN SERPL-MCNC: 10 MG/DL (ref 6–20)
BUN/CREAT SERPL: 16.4 (ref 7–25)
CALCIUM SPEC-SCNC: 9.2 MG/DL (ref 8.6–10.5)
CHLORIDE SERPL-SCNC: 104 MMOL/L (ref 98–107)
CO2 SERPL-SCNC: 24 MMOL/L (ref 22–29)
CREAT SERPL-MCNC: 0.61 MG/DL (ref 0.57–1)
DEPRECATED RDW RBC AUTO: 38.8 FL (ref 37–54)
EGFRCR SERPLBLD CKD-EPI 2021: 121.2 ML/MIN/1.73
EOSINOPHIL # BLD AUTO: 0.17 10*3/MM3 (ref 0–0.4)
EOSINOPHIL NFR BLD AUTO: 1.5 % (ref 0.3–6.2)
ERYTHROCYTE [DISTWIDTH] IN BLOOD BY AUTOMATED COUNT: 12 % (ref 12.3–15.4)
FLUAV RNA RESP QL NAA+PROBE: NOT DETECTED
FLUBV RNA RESP QL NAA+PROBE: NOT DETECTED
GEN 5 2HR TROPONIN T REFLEX: 10 NG/L
GLOBULIN UR ELPH-MCNC: 3 GM/DL
GLUCOSE SERPL-MCNC: 95 MG/DL (ref 65–99)
HCT VFR BLD AUTO: 36 % (ref 34–46.6)
HETEROPH AB SER QL LA: NEGATIVE
HGB BLD-MCNC: 11.8 G/DL (ref 12–15.9)
HOLD SPECIMEN: NORMAL
IMM GRANULOCYTES # BLD AUTO: 0.04 10*3/MM3 (ref 0–0.05)
IMM GRANULOCYTES NFR BLD AUTO: 0.3 % (ref 0–0.5)
LYMPHOCYTES # BLD AUTO: 1.89 10*3/MM3 (ref 0.7–3.1)
LYMPHOCYTES NFR BLD AUTO: 16.4 % (ref 19.6–45.3)
MAGNESIUM SERPL-MCNC: 1.8 MG/DL (ref 1.6–2.6)
MCH RBC QN AUTO: 29 PG (ref 26.6–33)
MCHC RBC AUTO-ENTMCNC: 32.8 G/DL (ref 31.5–35.7)
MCV RBC AUTO: 88.5 FL (ref 79–97)
MONOCYTES # BLD AUTO: 0.8 10*3/MM3 (ref 0.1–0.9)
MONOCYTES NFR BLD AUTO: 6.9 % (ref 5–12)
NEUTROPHILS NFR BLD AUTO: 74.4 % (ref 42.7–76)
NEUTROPHILS NFR BLD AUTO: 8.57 10*3/MM3 (ref 1.7–7)
NRBC BLD AUTO-RTO: 0 /100 WBC (ref 0–0.2)
PLATELET # BLD AUTO: 352 10*3/MM3 (ref 140–450)
PMV BLD AUTO: 9.4 FL (ref 6–12)
POTASSIUM SERPL-SCNC: 3.7 MMOL/L (ref 3.5–5.2)
PROT SERPL-MCNC: 7.4 G/DL (ref 6–8.5)
RBC # BLD AUTO: 4.07 10*6/MM3 (ref 3.77–5.28)
S PYO AG THROAT QL: POSITIVE
SARS-COV-2 RNA RESP QL NAA+PROBE: NOT DETECTED
SODIUM SERPL-SCNC: 138 MMOL/L (ref 136–145)
TROPONIN T DELTA: ABNORMAL
TROPONIN T SERPL HS-MCNC: <6 NG/L
WBC NRBC COR # BLD: 11.53 10*3/MM3 (ref 3.4–10.8)
WHOLE BLOOD HOLD COAG: NORMAL
WHOLE BLOOD HOLD SPECIMEN: NORMAL

## 2023-02-14 PROCEDURE — C9803 HOPD COVID-19 SPEC COLLECT: HCPCS | Performed by: EMERGENCY MEDICINE

## 2023-02-14 PROCEDURE — 84484 ASSAY OF TROPONIN QUANT: CPT

## 2023-02-14 PROCEDURE — 93005 ELECTROCARDIOGRAM TRACING: CPT | Performed by: EMERGENCY MEDICINE

## 2023-02-14 PROCEDURE — 87880 STREP A ASSAY W/OPTIC: CPT | Performed by: EMERGENCY MEDICINE

## 2023-02-14 PROCEDURE — 36415 COLL VENOUS BLD VENIPUNCTURE: CPT

## 2023-02-14 PROCEDURE — 86308 HETEROPHILE ANTIBODY SCREEN: CPT | Performed by: EMERGENCY MEDICINE

## 2023-02-14 PROCEDURE — 87636 SARSCOV2 & INF A&B AMP PRB: CPT | Performed by: EMERGENCY MEDICINE

## 2023-02-14 PROCEDURE — 99283 EMERGENCY DEPT VISIT LOW MDM: CPT | Performed by: INTERNAL MEDICINE

## 2023-02-14 PROCEDURE — 80053 COMPREHEN METABOLIC PANEL: CPT

## 2023-02-14 PROCEDURE — 83735 ASSAY OF MAGNESIUM: CPT

## 2023-02-14 PROCEDURE — 93005 ELECTROCARDIOGRAM TRACING: CPT

## 2023-02-14 PROCEDURE — 99284 EMERGENCY DEPT VISIT MOD MDM: CPT

## 2023-02-14 PROCEDURE — 85025 COMPLETE CBC W/AUTO DIFF WBC: CPT

## 2023-02-14 PROCEDURE — 84484 ASSAY OF TROPONIN QUANT: CPT | Performed by: EMERGENCY MEDICINE

## 2023-02-14 RX ORDER — SODIUM CHLORIDE 0.9 % (FLUSH) 0.9 %
10 SYRINGE (ML) INJECTION AS NEEDED
Status: DISCONTINUED | OUTPATIENT
Start: 2023-02-14 | End: 2023-02-14 | Stop reason: HOSPADM

## 2023-02-14 NOTE — DISCHARGE INSTRUCTIONS
Follow-up with the heart valve center for reevaluation.  Drink plenty of fluids.  It is very important that you fully hydrate.  When you stand up do not immediately walk.  Stand up for approximately 30 seconds to ensure that she will not be dizzy or lightheaded and then walk.  Try to follow-up with the same providers to ensure continuity of care.  Have a low threshold to return to the emergency department if symptoms worsen or other concerns arise.

## 2023-02-14 NOTE — CONSULTS
[facility]  CARDIOLOGY CONSULTATION NOTE    Naty Laureano  : 1990  MRN:[id number]  Home Phone:[phone]    Date of Consultation: 23    PCP: Kristi Luna APRN   Referring: Darshan Franz DO     IDENTIFICATION: A 32 y.o. female resident of [place], [place]     Chief Complaint   Patient presents with   • Syncope     PROBLEM LIST:   1.  Palpitations/ syncope / presyncope  2.  Bradycardia  A.  Echocardiogram 2022 LVEF normal with no significant valvular heart disease  B.  GXT exercise test 2022 walked for 5 minutes and 48 seconds with normal chronotropic response getting her heart rate up to 148 bpm.  C.  2-week monitor 2022 average heart rate 75 bpm with upper maximal heart rate 156 bpm and lowest heart rate of 37 bpm. No significant atrial or ventricular arrhythmias noted.  Her heart rate was below 60 bpm 30% of the time.  Symptoms of lightheadedness skipped beats fatigue shortness of  breath correlated to heart rates from 46 to 100 bpm.  3.  Migraine headaches  4.  History of tobacco abuse, inactive   5.  Anxiety disorder    ALLERGIES: No Known Allergies    HOME MEDICINES:   Current Outpatient Medications   Medication Instructions   • hydrOXYzine pamoate (VISTARIL) 50 mg, Oral, 3 Times Daily PRN   • meclizine (ANTIVERT) 25 mg, Oral, 3 Times Daily PRN   • multivitamin with minerals tablet tablet 1 tablet, Oral, Daily   • pantoprazole (PROTONIX) 20 mg, Oral, Daily       HPI: Ms. Laureano is a 33 y/o female with above history whom we are seeing in consultation for pre-syncope and palpitations. She notes these symptoms have been ongoing for about 2 years, however have recently gotten significantly more frequent/worse. She has been to the ER here and at [facility] a few times this month for syncope and pre-syncope. She was at work today when she stood up and felt like she was going to pass out and had to sit back in her chair. She has associated  palpitations with these episodes and light-headedness/ tunnel vision and tinnitus. She denies any chest pain. Most of her pre-syncopal and syncopal episodes occur with position change from sit to stand. She notes her palpitations are usually brief but this past Sunday it lasted 1.5 hours approximately. She was seen in the Heart and Valve center and MCOT was placed which she is currently wearing. She does not drink excessive caffeine and is staying well hydrated with water. She quit vaping, no alcohol or drug use. Family history of CAD in her father with CABG in his early 50s.      She had been seen recently at [facility] after a syncopal event where she reportedly lost consciousness, fell backwards and hit her head. She then he further symptoms of dizziness without LOC the following day and returned where she tells me her initial BP was in the 50s.       ROS: All systems have been reviewed and are negative with the exception of those mentioned in the HPI and problem list above.    Surgical History:   Past Surgical History:   Procedure Laterality Date   • CHOLECYSTECTOMY     • KNEE ACL RECONSTRUCTION Left        Social History:   Social History     Socioeconomic History   • Marital status: Single   Tobacco Use   • Smoking status: Some Days     Types: Cigarettes     Passive exposure: Current   • Smokeless tobacco: Never   • Tobacco comments:     Process of quitting   Vaping Use   • Vaping Use: Former   • Substances: Nicotine   • Devices: Disposable   Substance and Sexual Activity   • Alcohol use: Not Currently     Comment: social   • Drug use: Never   • Sexual activity: Defer       Family History:   Family History   Problem Relation Age of Onset   • No Known Problems Mother    • Heart disease Father    • Coronary artery disease Father 50        CABG   • Osteoporosis Maternal Grandmother    • Rheum arthritis Maternal Grandmother    • Ulcers Maternal Grandmother    • Coronary artery disease Maternal Grandfather   "  • Heart disease Maternal Grandfather    • Heart disease Paternal Grandmother    • Breast cancer Paternal Grandmother    • Heart disease Paternal Grandfather        Objective     /77 (BP Location: Left arm, Patient Position: Sitting)   Pulse 82   Temp 98.8 °F (37.1 °C) (Oral)   Resp 16   Ht 165.1 cm (65\")   Wt 90.7 kg (200 lb)   SpO2 99%   BMI 33.28 kg/m²   No intake or output data in the 24 hours ending 02/14/23 1552    PHYSICAL EXAM:  CONSTITUTIONAL: Well nourished, cooperative, in no acute distress  HEENT: Normocephalic, atraumatic, PERRLA, no JVD, no carotid bruits  CARDIOVASCULAR:  Regular rhythm and normal rate, no murmur, gallop, rub.   RESPIRATORY: Clear to auscultation, normal respiratory effort, no wheezing, rales or rhonchi  EXTREMITIES: No gross deformities, no edema.   SKIN: Warm, dry. No bleeding, bruising or rash  NEUROLOGICAL: No focal deficits  PSYCHIATRIC: Normal mood and affect. Behavior is normal     Labs/Diagnostic Data  Results from last 7 days   Lab Units 02/14/23  1312   SODIUM mmol/L 138   POTASSIUM mmol/L 3.7   CHLORIDE mmol/L 104   CO2 mmol/L 24.0   BUN mg/dL 10   CREATININE mg/dL 0.61   GLUCOSE mg/dL 95   CALCIUM mg/dL 9.2     Results from last 7 days   Lab Units 02/14/23  1642 02/14/23  1312   HSTROP T ng/L 10* <6     Results from last 7 days   Lab Units 02/14/23  1312   WBC 10*3/mm3 11.53*   HEMOGLOBIN g/dL 11.8*   HEMATOCRIT % 36.0   PLATELETS 10*3/mm3 352     Results from last 7 days   Lab Units 02/14/23  1312   MAGNESIUM mg/dL 1.8     Lab Results   Component Value Date    TSH 3.870 02/01/2023     I personally reviewed the patient's EKG/Telemetry data    Radiology Data:  CTA chest 2/1/23:  IMPRESSION:  Impression:  No evidence of pulmonary embolus or other acute process in the chest.    On my review I do not appreciate any CAC however this is a non-gated scan    Assessment and Plan:     1. Palpitations  2. Pre-syncope/syncope  - Most of these occur with position change "   3. Sinus bradycardia with chronotropic competence   4. Possible JARED      Plan:  1. Recommend checking orthostatics.   2. Current MCOT strips that are available for review do not show any significant arrhythmias or significant pauses. Strips from 2/12 show sinus bradycardia and sinus arrhythmia. She has some bradycardia with lowest HR 52bpm. Continue MCOT   3. Continue hydration measures, syncopal precautions discussed.   4. Would recommend OP sleep study to evaluate for JARED. Will place referral   5. No further cardiac work-up at this time. Would recommend follow-up with Heart and Valve clinic as previous scheduled. Would benefit from having close, regular follow-up with her PCP. Aside from her presyncope symptoms she also reports a number of other symptoms including muscle weakness and sensory symptoms which do not appear related to her bradycardia.     Scribed for Jamie Delong MD by Francine Mccoy PA-C. 2/14/2023  16:42 EST      I personally performed the services described in this documentation as scribed by the above named individual in my presence, and it is both accurate and complete.    LIBIA Delong MD  02/14/23  17:29 EST

## 2023-02-14 NOTE — ED PROVIDER NOTES
Subjective   History of Present Illness  Patient is a pleasant 32-year-old female with history of bradycardia and multiple syncopal episodes who presents today following a near syncopal episode.  She states that for the last over 1 month she has had multiple episodes of syncope or near syncope.  Total of 5 episodes now.  She seen the cardiology service here at [facility] and a cardiac monitor was placed.  She has been wearing the monitor as recommended.  She states that today she was at work.  When she stood up she became very lightheaded and near syncopal.  She temporarily lost vision but does not believe she fully was unconscious during today's episode.  Symptoms resolved quickly.  2 days ago she was at a [employer] party and she became pale and passed out.  Typically, whenever she loses consciousness and she lies down she immediately wakes up.      In addition to the episode of near syncope today she also notes that for the past 1 to 2 weeks she has had a sore throat which has been getting progressively worse.  She also subjectively notes that she has increased swelling under her chin.        Review of Systems    Past Medical History:   Diagnosis Date   • Abnormal ECG    • Anxiety    • Bradycardia        No Known Allergies    Past Surgical History:   Procedure Laterality Date   • CHOLECYSTECTOMY     • KNEE ACL RECONSTRUCTION Left        Family History   Problem Relation Age of Onset   • No Known Problems Mother    • Heart disease Father    • Coronary artery disease Father 50        CABG   • Osteoporosis Maternal Grandmother    • Rheum arthritis Maternal Grandmother    • Ulcers Maternal Grandmother    • Coronary artery disease Maternal Grandfather    • Heart disease Maternal Grandfather    • Heart disease Paternal Grandmother    • Breast cancer Paternal Grandmother    • Heart disease Paternal Grandfather        Social History     Socioeconomic History   • Marital status: Single   Tobacco Use   • Smoking status: Some  Days     Types: Cigarettes     Passive exposure: Current   • Smokeless tobacco: Never   • Tobacco comments:     Process of quitting   Vaping Use   • Vaping Use: Former   • Substances: Nicotine   • Devices: Disposable   Substance and Sexual Activity   • Alcohol use: Not Currently     Comment: social   • Drug use: Never   • Sexual activity: Defer           Objective   Physical Exam  Vitals and nursing note reviewed.   Constitutional:       General: She is not in acute distress.  HENT:      Head: Normocephalic and atraumatic.   Eyes:      Conjunctiva/sclera: Conjunctivae normal.      Pupils: Pupils are equal, round, and reactive to light.   Neck:      Thyroid: No thyromegaly.   Cardiovascular:      Rate and Rhythm: Normal rate and regular rhythm.      Heart sounds: Normal heart sounds. No murmur heard.    No friction rub. No gallop.   Pulmonary:      Effort: Pulmonary effort is normal. No respiratory distress.      Breath sounds: Normal breath sounds.   Abdominal:      General: Bowel sounds are normal.      Palpations: Abdomen is soft.      Tenderness: There is no abdominal tenderness.   Musculoskeletal:         General: Normal range of motion.      Cervical back: Normal range of motion and neck supple. Tenderness (diffuse tenderness to palpaiton of the anterior neck) present.   Lymphadenopathy:      Cervical: No cervical adenopathy.   Skin:     General: Skin is warm and dry.   Neurological:      Mental Status: She is alert and oriented to person, place, and time.   Psychiatric:         Behavior: Behavior normal.         Thought Content: Thought content normal.         Procedures           ED Course      XR Chest 1 View    Result Date: 2/1/2023  XR CHEST 1 VW Date of Exam: 2/1/2023 2:12 PM EST Indication: Chest Pain Triage Protocol. Comparison: None available. Findings: Patient is rotated to the left. Heart shadow is normal in size. The vasculature appears normal. There is mild nonspecific peribronchial thickening, but  no focal lung disease, effusion or pneumothorax is seen.     Impression: Mild nonspecific peribronchial thickening. No other evidence of active chest pathology. Electronically Signed: Nicola Kinsey  2/1/2023 2:32 PM EST  Workstation ID: [id number]    CT Angiogram Chest    Result Date: 2/1/2023  CT ANGIOGRAM CHEST Date of Exam: 2/1/2023 3:43 PM EST Indication: CP, SOB, dizziness. Comparison: None available. Technique: CTA of the chest was performed after the uneventful intravenous administration of 80 mL Isovue 300. Reconstructed coronal and sagittal images were also obtained. In addition, a 3-D volume rendered image was created for interpretation. Automated exposure control and iterative reconstruction methods were used. Findings: There is no pathologic axillary adenopathy or other worrisome body wall soft tissue finding in the chest. No acute findings are present in the partially characterized upper abdomen. There is no pleural or pericardial effusion. There is no pathologic mediastinal adenopathy. Nonaneurysmal thoracic aorta. The pulmonary arteries are well-opacified and without evidence of focal filling defect concerning for acute pulmonary embolus. Evaluation of the osseous structures demonstrates no evidence of acute fracture or aggressive osseous lesion. Evaluation of the lung fields demonstrates no evidence of acute infectious process or suspicious pulmonary nodularity.     Impression: No evidence of pulmonary embolus or other acute process in the chest. Electronically Signed: Sharath Groves  2/1/2023 4:04 PM EST  Workstation ID: [id number]             Latest Reference Range & Units 02/14/23 13:12   HS Troponin T <10 ng/L <6   Glucose 65 - 99 mg/dL 95   Sodium 136 - 145 mmol/L 138   Potassium 3.5 - 5.2 mmol/L 3.7   CO2 22.0 - 29.0 mmol/L 24.0   Chloride 98 - 107 mmol/L 104   Anion Gap 5.0 - 15.0 mmol/L 10.0   Creatinine 0.57 - 1.00 mg/dL 0.61   BUN 6 - 20 mg/dL 10   BUN/Creatinine Ratio 7.0 - 25.0  16.4   Calcium 8.6  - 10.5 mg/dL 9.2   eGFR >60.0 mL/min/1.73 121.2   Alkaline Phosphatase 39 - 117 U/L 101   Total Protein 6.0 - 8.5 g/dL 7.4   ALT (SGPT) 1 - 33 U/L 29   AST (SGOT) 1 - 32 U/L 17   Total Bilirubin 0.0 - 1.2 mg/dL 1.0   Albumin 3.5 - 5.2 g/dL 4.4   Globulin gm/dL 3.0   A/G Ratio g/dL 1.5   Magnesium 1.6 - 2.6 mg/dL 1.8   WBC 3.40 - 10.80 10*3/mm3 11.53 (H)   RBC 3.77 - 5.28 10*6/mm3 4.07   Hemoglobin 12.0 - 15.9 g/dL 11.8 (L)   Hematocrit 34.0 - 46.6 % 36.0   RDW 12.3 - 15.4 % 12.0 (L)   MCV 79.0 - 97.0 fL 88.5   MCH 26.6 - 33.0 pg 29.0   MCHC 31.5 - 35.7 g/dL 32.8   MPV 6.0 - 12.0 fL 9.4   Platelets 140 - 450 10*3/mm3 352   RDW-SD 37.0 - 54.0 fl 38.8   Neutrophil Rel % 42.7 - 76.0 % 74.4   Lymphocyte Rel % 19.6 - 45.3 % 16.4 (L)   Monocyte Rel % 5.0 - 12.0 % 6.9   Eosinophil Rel % 0.3 - 6.2 % 1.5   Basophil Rel % 0.0 - 1.5 % 0.5   Immature Granulocyte Rel % 0.0 - 0.5 % 0.3   Neutrophils Absolute 1.70 - 7.00 10*3/mm3 8.57 (H)   Lymphocytes Absolute 0.70 - 3.10 10*3/mm3 1.89   Monocytes Absolute 0.10 - 0.90 10*3/mm3 0.80   Eosinophils Absolute 0.00 - 0.40 10*3/mm3 0.17   Basophils Absolute 0.00 - 0.20 10*3/mm3 0.06   Immature Grans, Absolute 0.00 - 0.05 10*3/mm3 0.04   nRBC 0.0 - 0.2 /100 WBC 0.0   (H): Data is abnormally high  (L): Data is abnormally low                           Medical Decision Making  Etiology of patient's symptoms is not clear.  Cardiology has evaluated the patient both today and on previous visits.  Patient does have a Holter monitor in place.  They do not see any emergent process.  They interrogated the monitor and other than some mild sinus bradycardia, which should not result in syncope, there have been no significant arrhythmias or concerning processes.  Work-up today is reassuring.  At the time of discharge strep screen, COVID, flu, and mono was pending.  Strep did result positive and the antibiotic was sent to the patient's pharmacy.  I also called the patient on the phone on 2/15 and  updated her about these results.    Episodic lightheadedness: complicated acute illness or injury  Syncope, unspecified syncope type: complicated acute illness or injury  Amount and/or Complexity of Data Reviewed  External Data Reviewed: notes.  Labs: ordered. Decision-making details documented in ED Course.  ECG/medicine tests: ordered and independent interpretation performed. Decision-making details documented in ED Course.  Discussion of management or test interpretation with external provider(s): Cardiology    Risk  Prescription drug management.  Decision regarding hospitalization.          Final diagnoses:   Syncope, unspecified syncope type   Episodic lightheadedness       ED Disposition  ED Disposition     ED Disposition   Discharge    Condition   Stable    Comment   --           DISCHARGE    Patient discharged in stable condition.    Reviewed implications of results, diagnosis, meds, responsibility to follow up, warning signs and symptoms of possible worsening, potential complications and reasons to return to ER.    Patient/Family voiced understanding of above instructions.    Discussed plan for discharge, as there is no emergent indication for admission.  Pt/family is agreeable and understands need for follow up and possible repeat testing.  Pt/family is aware that discharge does not mean that nothing is wrong but that it indicates no emergency is currently present that requires admission and they must continue care with follow-up as given below or with a physician of their choice.     FOLLOW-UP  Kristi Luna, APRN  [address]  [facility] [address] [phone]    Schedule an appointment as soon as possible for a visit       [facility] Emergency Department  [address] [facility] [facility] [address] [phone]    If symptoms worsen    [facility]  [address] [facility] [address]  [facility]  [address]  [phone]    If symptoms worsen           Medication List      New Prescriptions    amoxicillin 500 MG capsule  Commonly known as: AMOXIL  Take 2 capsules by mouth 2 (Two) Times a Day for 8 days.           Where to Get Your Medications      These medications were sent to [facility] [id number] - [place], [place] - [address] AT [facility] [address] - [phone]  - [phone]   [address] [facility], [facility] [address]    Phone: [phone]   · amoxicillin 500 MG capsule            Darshan Franz DO  02/15/23 6668

## 2023-02-15 ENCOUNTER — TELEPHONE (OUTPATIENT)
Dept: EMERGENCY DEPT | Facility: HOSPITAL | Age: 33
End: 2023-02-15
Payer: COMMERCIAL

## 2023-02-15 RX ORDER — AMOXICILLIN 500 MG/1
1000 CAPSULE ORAL 2 TIMES DAILY
Qty: 32 CAPSULE | Refills: 0 | Status: SHIPPED | OUTPATIENT
Start: 2023-02-15 | End: 2023-02-23

## 2023-02-15 NOTE — TELEPHONE ENCOUNTER
Telephone call to patient's number of record to advise of positive strep screen.  No answer, went to voicemail.  Message left.  Unable to contact letter printed signed and handed to charge nurse Radha at 1554 hrs.

## 2023-02-15 NOTE — TELEPHONE ENCOUNTER
"Telephone call from patient returning my voicemail.  Advised of positive strep screen and need for antibiotic be called in.  She states that \"the doctor last night called me and said he would call 1 in\".  I said there was no note in the chart, and apologize for the repeat call.  I did tell her that if she checked with her pharmacy and nothing was phoned and that she can call back and we would  be happy to take care of that for her.  She verbalized understanding and is agreeable to plan  "

## 2023-02-17 ENCOUNTER — HOSPITAL ENCOUNTER (OUTPATIENT)
Dept: CARDIOLOGY | Facility: HOSPITAL | Age: 33
Discharge: HOME | End: 2023-02-17
Payer: COMMERCIAL

## 2023-02-17 DIAGNOSIS — R00.1 BRADYCARDIA, SINUS: ICD-10-CM

## 2023-02-17 DIAGNOSIS — R42 LIGHTHEADEDNESS: ICD-10-CM

## 2023-02-17 DIAGNOSIS — R55 SYNCOPE AND COLLAPSE: ICD-10-CM

## 2023-02-17 DIAGNOSIS — R07.9 CHEST PAIN, UNSPECIFIED TYPE: ICD-10-CM

## 2023-02-17 LAB
BH CV STRESS BP STAGE 1: NORMAL
BH CV STRESS BP STAGE 2: NORMAL
BH CV STRESS DURATION MIN STAGE 1: 3
BH CV STRESS DURATION MIN STAGE 2: 2
BH CV STRESS DURATION SEC STAGE 1: 0
BH CV STRESS DURATION SEC STAGE 2: 31
BH CV STRESS GRADE STAGE 1: 10
BH CV STRESS GRADE STAGE 2: 12
BH CV STRESS HR STAGE 1: 139
BH CV STRESS HR STAGE 2: 162
BH CV STRESS METS STAGE 1: 5
BH CV STRESS METS STAGE 2: 7.5
BH CV STRESS O2 STAGE 1: 99
BH CV STRESS O2 STAGE 2: 99
BH CV STRESS PROTOCOL 1: NORMAL
BH CV STRESS RECOVERY BP: NORMAL MMHG
BH CV STRESS RECOVERY HR: 93 BPM
BH CV STRESS RECOVERY O2: 98 %
BH CV STRESS SPEED STAGE 1: 1.7
BH CV STRESS SPEED STAGE 2: 2.5
BH CV STRESS STAGE 1: 1
BH CV STRESS STAGE 2: 2
MAXIMAL PREDICTED HEART RATE: 187 BPM
PERCENT MAX PREDICTED HR: 86.63 %
STRESS BASELINE BP: NORMAL MMHG
STRESS BASELINE HR: 86 BPM
STRESS O2 SAT REST: 99 %
STRESS PERCENT HR: 102 %
STRESS POST ESTIMATED WORKLOAD: 7 METS
STRESS POST EXERCISE DUR MIN: 5 MIN
STRESS POST EXERCISE DUR SEC: 31 SEC
STRESS POST O2 SAT PEAK: 99 %
STRESS POST PEAK BP: NORMAL MMHG
STRESS POST PEAK HR: 162 BPM
STRESS TARGET HR: 159 BPM

## 2023-02-17 PROCEDURE — 93018 CV STRESS TEST I&R ONLY: CPT | Performed by: INTERNAL MEDICINE

## 2023-02-17 PROCEDURE — 93017 CV STRESS TEST TRACING ONLY: CPT

## 2023-02-18 LAB
QT INTERVAL: 382 MS
QTC INTERVAL: 443 MS

## 2023-02-20 NOTE — PROGRESS NOTES
Your stress test was negative for ischemia/significant heart blockage.  Your heart rate went up appropriately.  Your blood pressure did get high with exercise, however, baseline blood pressure was normal.  Please let me know if you have any further questions or concerns

## 2023-03-27 NOTE — PROGRESS NOTES
"[facility]  [facility]      Mode of Visit: Video  Location of patient: other: parked car  You have chosen to receive care through a telehealth visit.  Does the patient consent to use a video/audio connection for your medical care today? Yes  The visit included audio and video interaction. No technical issues occurred during this visit.     Chief Complaint  Follow-up and Dizziness    History of Present Illness    Naty Laureano is a 32 y.o. female with bradycardia, history of atypical chest pain and dizziness who presents today for a telemedicine follow up on bradycardia, chest pain, and syncope.    Patient presented to the ED on 2/1 with complaints of chest pain.  She reports a midsternal chest pressure and shortness of breath with associated lightheadedness.  She was also evaluated at [place] ED 1 day prior.  Reports she had a pain in the back of her neck that shot down her spine and says she \"passed out\" on the floor for a few seconds. CTA chest, labs and EKGs unremarkable.     She was evaluated last year by Dr. Wilson for lightheadedness and bradycardia.  She had an exercise stress test in March 2022 with normal chronotropic response and no evidence of ischemia.  She wore 2-week Holter monitor in April 2022 which showed no arrhythmias, sinus bradycardia noted    Patient presented back to the ED on 2/14.  Cardiologist, Dr. Delong, was consulted who recommended no additional cardiac work-up at this time and to continue wearing event monitor.  She had a normal treadmill stress test.  Max heart rate was 162.    Received approximately 13 days from her cardiac event monitor.  Triggered events were normal sinus rhythm, sinus bradycardia, PVCs, PACs and sinus tachycardia.  No significant arrhythmias identified.  Rare ectopy.  Average heart rate was 73.    Reports ongoing fatigue and weakness. Dizziness seems to be improved. Reports fluctuating blood pressure, sometimes low and " "sometimes high. Most of her symptoms occur when she goes from sitting to standing. Reports fast heart rates when standing. Drinks plenty of fluid. No further syncope,      Objective     Vital Signs:   Vitals:    03/28/23 0904   Pulse: 57   Weight: 90.7 kg (200 lb)   Height: 165.1 cm (65\")     Body mass index is 33.28 kg/m².    Virtual Visit Physical Exam  Physical Exam  Constitutional:       Appearance: Normal appearance.   HENT:      Head: Normocephalic.   Pulmonary:      Effort: Pulmonary effort is normal. No respiratory distress.   Neurological:      Mental Status: She is alert and oriented to person, place, and time.   Psychiatric:         Mood and Affect: Mood normal.         Behavior: Behavior normal.         Thought Content: Thought content normal.              Result Review  Data Reviewed:{ Labs  Result Review  Imaging  Med Tab  Media :23}   Treadmill Stress Test (02/17/2023 15:26)  ECG 12 Lead ED Triage Standing Order; Syncope (02/14/2023 13:04)  COVID PRE-OP / PRE-PROCEDURE SCREENING ORDER (NO ISOLATION) - Swab, Nasopharynx (02/14/2023 18:17)  Mononucleosis Screen (02/14/2023 18:17)  Rapid Strep A Screen - Swab, Throat (02/14/2023 18:17)  High Sensitivity Troponin T 2Hr (02/14/2023 16:42)  High Sensitivity Troponin T (02/14/2023 13:12)  Magnesium (02/14/2023 13:12)  Comprehensive Metabolic Panel (02/14/2023 13:12)  CBC & Differential (02/14/2023 13:12)    Event monitor x13 days average heart rate 73, minimum heart rate 41 and a maximum heart rate of 174 bpm.  There were rare PACs and PVCs.  52 stable, triggered events noted         Assessment and Plan {CC Problem List  Visit Diagnosis  ROS  Review (Popup)  Health Maintenance  Quality  BestPractice  Medications  SmartSets  SnapShot Encounters  Media :23}   1. Lightheadedness  - Symptoms have improved  -Encouraged her to continue adequate hydration.  Recommend that she increase cardiovascular exercise    2. Bradycardia, sinus  -No significant " bradycardia noted.  Appropriate chronotropic response    3. Syncope and collapse  - Possible vasovagal event  -We discussed doing a tilt table test, including possible outcomes.  At this time, she defers and would like to continue work-up with her PCP due to other various symptoms she is experiencing.  Advised to contact me if she has worsening symptoms    Keep scheduled appointment with Dr. Wilson in August        Follow Up {Instructions Charge Capture  Follow-up Communications :23}   Return if symptoms worsen or fail to improve.    Patient was given instructions and counseling regarding her condition or for health maintenance advice. Please see specific information pulled into the AVS if appropriate.  Advised to call the [facility] Center with any questions, concerns, or worsening symptoms.    Dictated Utilizing Dragon Dictation

## 2023-03-28 ENCOUNTER — TELEMEDICINE (OUTPATIENT)
Dept: CARDIOLOGY | Facility: HOSPITAL | Age: 33
End: 2023-03-28
Payer: COMMERCIAL

## 2023-03-28 VITALS — HEIGHT: 65 IN | BODY MASS INDEX: 33.32 KG/M2 | WEIGHT: 200 LBS | HEART RATE: 57 BPM

## 2023-03-28 DIAGNOSIS — R42 LIGHTHEADEDNESS: Primary | ICD-10-CM

## 2023-03-28 DIAGNOSIS — R55 SYNCOPE AND COLLAPSE: ICD-10-CM

## 2023-03-28 DIAGNOSIS — R00.1 BRADYCARDIA, SINUS: ICD-10-CM

## 2023-03-28 PROCEDURE — 99213 OFFICE O/P EST LOW 20 MIN: CPT | Performed by: NURSE PRACTITIONER

## 2023-08-17 ENCOUNTER — TELEPHONE (OUTPATIENT)
Dept: CARDIOLOGY | Facility: CLINIC | Age: 33
End: 2023-08-17

## 2023-08-17 NOTE — TELEPHONE ENCOUNTER
Caller: ABDIAS    Relationship: NURSE  FROM [facility]    Best call back number: [phone]     What form or medical record are you requesting: ED NOTE FROM 2.14.23    Who is requesting this form or medical record from you: NURSE  FROM [facility]    How would you like to receive the form or medical records (pick-up, mail, fax): FAX  If fax, what is the fax number: [phone]      Timeframe paperwork needed: ASAP